# Patient Record
Sex: FEMALE
[De-identification: names, ages, dates, MRNs, and addresses within clinical notes are randomized per-mention and may not be internally consistent; named-entity substitution may affect disease eponyms.]

---

## 2023-06-08 PROBLEM — Z00.00 ENCOUNTER FOR PREVENTIVE HEALTH EXAMINATION: Status: ACTIVE | Noted: 2023-06-08

## 2023-06-09 ENCOUNTER — APPOINTMENT (OUTPATIENT)
Dept: INTERNAL MEDICINE | Facility: CLINIC | Age: 58
End: 2023-06-09
Payer: COMMERCIAL

## 2023-06-09 ENCOUNTER — LABORATORY RESULT (OUTPATIENT)
Age: 58
End: 2023-06-09

## 2023-06-09 VITALS
RESPIRATION RATE: 16 BRPM | TEMPERATURE: 98.6 F | HEIGHT: 60 IN | BODY MASS INDEX: 22.97 KG/M2 | WEIGHT: 117 LBS | OXYGEN SATURATION: 97 % | HEART RATE: 89 BPM

## 2023-06-09 DIAGNOSIS — E03.9 HYPOTHYROIDISM, UNSPECIFIED: ICD-10-CM

## 2023-06-09 PROCEDURE — 99214 OFFICE O/P EST MOD 30 MIN: CPT

## 2023-06-09 PROCEDURE — 99204 OFFICE O/P NEW MOD 45 MIN: CPT | Mod: 25

## 2023-06-09 PROCEDURE — 36415 COLL VENOUS BLD VENIPUNCTURE: CPT

## 2023-06-09 NOTE — HISTORY OF PRESENT ILLNESS
[FreeTextEntry8] : 57 F here to establish care\par hx of hypothyroidism\par patient is on levothyroxine. compliant with medication\par denies any side effects like palpitations.\par denies any fatigue, weight gain, cold intolerance or dry skin.\par Pt came from [facility] 5 years ago\par Occitan is primary ghazalagava - [employer] garcia translation - pt prefer in person \par \par PMHX:\par Hypothyroidism - needs medication\par History of thyroid nodules - 1 month ago had US thyroid \par \par Allergies: NKDA\par \par Medications:\par Levothyroxine 88mcg qd \par \par Surgical Hx:\par [place] Prolapse 6 [place] ago [place] \par \par Family Hx;\par Mother: Thyroid \par Father:  in accident \par Siblings\par 1 sister thyroid issue\par Social Hx\par ETOH  -  socially\par Tobacco - denies\par Illict Drug use - denies\par Occupation: Nanny/ House Keeper\par

## 2023-06-09 NOTE — ASSESSMENT
[FreeTextEntry1] : #Hypothyroidism\par check cbc, cmp and tsh and free t4\par continue current management \par will adjust medication if necessary\par pt will send previous pcp records from [facility] and  of thryoid report\par pt agrees and understands plan via teach back method. all questions answered.\par

## 2023-06-09 NOTE — HEALTH RISK ASSESSMENT
[No] : No [No falls in past year] : Patient reported no falls in the past year [0] : 2) Feeling down, depressed, or hopeless: Not at all (0) [PHQ-2 Negative - No further assessment needed] : PHQ-2 Negative - No further assessment needed [Never] : Never [[id number]] : 0

## 2023-06-09 NOTE — HISTORY OF PRESENT ILLNESS
[FreeTextEntry8] : 57 F here to establish care\par hx of hypothyroidism\par patient is on levothyroxine. compliant with medication\par denies any side effects like palpitations.\par denies any fatigue, weight gain, cold intolerance or dry skin.\par Pt came from [facility] 5 years ago\par German is primary ghazalagava - [employer] garcia translation - pt prefer in person \par \par PMHX:\par Hypothyroidism - needs medication\par History of thyroid nodules - 1 month ago had US thyroid \par \par Allergies: NKDA\par \par Medications:\par Levothyroxine 88mcg qd \par \par Surgical Hx:\par [place] Prolapse 6 [place] ago [place] \par \par Family Hx;\par Mother: Thyroid \par Father:  in accident \par Siblings\par 1 sister thyroid issue\par Social Hx\par ETOH  -  socially\par Tobacco - denies\par Illict Drug use - denies\par Occupation: Nanny/ House Keeper\par

## 2023-06-09 NOTE — HEALTH RISK ASSESSMENT
[No] : No [No falls in past year] : Patient reported no falls in the past year [0] : 2) Feeling down, depressed, or hopeless: Not at all (0) [PHQ-2 Negative - No further assessment needed] : PHQ-2 Negative - No further assessment needed [Never] : Never [[id number]] : 0

## 2023-06-10 ENCOUNTER — NON-APPOINTMENT (OUTPATIENT)
Age: 58
End: 2023-06-10

## 2023-06-10 DIAGNOSIS — R79.89 OTHER SPECIFIED ABNORMAL FINDINGS OF BLOOD CHEMISTRY: ICD-10-CM

## 2023-06-10 DIAGNOSIS — Z86.39 PERSONAL HISTORY OF OTHER ENDOCRINE, NUTRITIONAL AND METABOLIC DISEASE: ICD-10-CM

## 2023-06-10 LAB
ALBUMIN SERPL ELPH-MCNC: 4.7 G/DL
ALP BLD-CCNC: 92 U/L
ALT SERPL-CCNC: 34 U/L
ANION GAP SERPL CALC-SCNC: 12 MMOL/L
AST SERPL-CCNC: 25 U/L
BILIRUB SERPL-MCNC: 0.4 MG/DL
BUN SERPL-MCNC: 15 MG/DL
CALCIUM SERPL-MCNC: 10 MG/DL
CHLORIDE SERPL-SCNC: 106 MMOL/L
CO2 SERPL-SCNC: 22 MMOL/L
CREAT SERPL-MCNC: 0.77 MG/DL
EGFR: 90 ML/MIN/1.73M2
GLUCOSE SERPL-MCNC: 88 MG/DL
HCT VFR BLD CALC: 44.6 %
HGB BLD-MCNC: 15.7 G/DL
MCHC RBC-ENTMCNC: 32.6 PG
MCHC RBC-ENTMCNC: 35.2 GM/DL
MCV RBC AUTO: 92.7 FL
PLATELET # BLD AUTO: 290 K/UL
POTASSIUM SERPL-SCNC: 3.9 MMOL/L
PROT SERPL-MCNC: 7.2 G/DL
RBC # BLD: 4.81 M/UL
RBC # FLD: 12.9 %
SODIUM SERPL-SCNC: 140 MMOL/L
TSH SERPL-ACNC: 0.23 UIU/ML
WBC # FLD AUTO: 5.55 K/UL

## 2023-06-21 RX ORDER — ALENDRONATE SODIUM 70 MG/1
70 TABLET ORAL
Qty: 12 | Refills: 0 | Status: ACTIVE | COMMUNITY
Start: 2023-06-14 | End: 1900-01-01

## 2023-06-21 RX ORDER — LEVOTHYROXINE SODIUM 0.07 MG/1
75 TABLET ORAL
Qty: 90 | Refills: 0 | Status: ACTIVE | COMMUNITY
Start: 2023-06-09 | End: 1900-01-01

## 2024-01-11 ENCOUNTER — APPOINTMENT (OUTPATIENT)
Dept: ENDOCRINOLOGY | Facility: CLINIC | Age: 59
End: 2024-01-11